# Patient Record
Sex: FEMALE | Race: WHITE | HISPANIC OR LATINO
[De-identification: names, ages, dates, MRNs, and addresses within clinical notes are randomized per-mention and may not be internally consistent; named-entity substitution may affect disease eponyms.]

---

## 2017-01-18 ENCOUNTER — APPOINTMENT (OUTPATIENT)
Dept: BREAST CENTER | Facility: CLINIC | Age: 62
End: 2017-01-18

## 2017-01-18 VITALS
TEMPERATURE: 97.9 F | HEIGHT: 65 IN | DIASTOLIC BLOOD PRESSURE: 89 MMHG | HEART RATE: 88 BPM | BODY MASS INDEX: 26.66 KG/M2 | SYSTOLIC BLOOD PRESSURE: 151 MMHG | WEIGHT: 160 LBS

## 2017-01-18 DIAGNOSIS — Z12.39 ENCOUNTER FOR OTHER SCREENING FOR MALIGNANT NEOPLASM OF BREAST: ICD-10-CM

## 2017-02-15 ENCOUNTER — APPOINTMENT (OUTPATIENT)
Dept: RADIATION ONCOLOGY | Facility: CLINIC | Age: 62
End: 2017-02-15

## 2017-04-05 ENCOUNTER — OUTPATIENT (OUTPATIENT)
Dept: OUTPATIENT SERVICES | Facility: HOSPITAL | Age: 62
LOS: 1 days | End: 2017-04-05
Payer: COMMERCIAL

## 2017-04-05 PROCEDURE — A9560: CPT

## 2017-04-05 PROCEDURE — 78472 GATED HEART PLANAR SINGLE: CPT | Mod: 26

## 2017-04-05 PROCEDURE — 78472 GATED HEART PLANAR SINGLE: CPT

## 2017-07-05 ENCOUNTER — APPOINTMENT (OUTPATIENT)
Dept: RADIATION ONCOLOGY | Facility: CLINIC | Age: 62
End: 2017-07-05

## 2017-07-05 VITALS
DIASTOLIC BLOOD PRESSURE: 79 MMHG | WEIGHT: 164.38 LBS | OXYGEN SATURATION: 100 % | SYSTOLIC BLOOD PRESSURE: 147 MMHG | HEART RATE: 86 BPM | BODY MASS INDEX: 27.35 KG/M2

## 2017-07-05 DIAGNOSIS — C50.919 MALIGNANT NEOPLASM OF UNSPECIFIED SITE OF UNSPECIFIED FEMALE BREAST: ICD-10-CM

## 2017-10-18 ENCOUNTER — OUTPATIENT (OUTPATIENT)
Dept: OUTPATIENT SERVICES | Facility: HOSPITAL | Age: 62
LOS: 1 days | End: 2017-10-18
Payer: COMMERCIAL

## 2017-10-18 PROCEDURE — A9560: CPT

## 2017-10-18 PROCEDURE — 78472 GATED HEART PLANAR SINGLE: CPT

## 2017-10-18 PROCEDURE — 78472 GATED HEART PLANAR SINGLE: CPT | Mod: 26

## 2018-01-10 ENCOUNTER — APPOINTMENT (OUTPATIENT)
Dept: RADIATION ONCOLOGY | Facility: CLINIC | Age: 63
End: 2018-01-10

## 2018-04-23 ENCOUNTER — OTHER (OUTPATIENT)
Age: 63
End: 2018-04-23

## 2018-09-04 ENCOUNTER — OTHER (OUTPATIENT)
Age: 63
End: 2018-09-04

## 2018-09-06 ENCOUNTER — OTHER (OUTPATIENT)
Age: 63
End: 2018-09-06

## 2018-10-05 ENCOUNTER — APPOINTMENT (OUTPATIENT)
Dept: BREAST CENTER | Facility: CLINIC | Age: 63
End: 2018-10-05
Payer: COMMERCIAL

## 2018-10-05 VITALS
HEART RATE: 83 BPM | HEIGHT: 65 IN | BODY MASS INDEX: 27.32 KG/M2 | SYSTOLIC BLOOD PRESSURE: 151 MMHG | DIASTOLIC BLOOD PRESSURE: 83 MMHG | WEIGHT: 164 LBS | TEMPERATURE: 98.3 F

## 2018-10-05 DIAGNOSIS — Z85.3 PERSONAL HISTORY OF MALIGNANT NEOPLASM OF BREAST: ICD-10-CM

## 2018-10-05 PROCEDURE — 99214 OFFICE O/P EST MOD 30 MIN: CPT

## 2018-11-04 ENCOUNTER — FORM ENCOUNTER (OUTPATIENT)
Age: 63
End: 2018-11-04

## 2018-11-05 ENCOUNTER — APPOINTMENT (OUTPATIENT)
Dept: ULTRASOUND IMAGING | Facility: HOSPITAL | Age: 63
End: 2018-11-05

## 2018-11-05 ENCOUNTER — RESULT REVIEW (OUTPATIENT)
Age: 63
End: 2018-11-05

## 2018-11-05 ENCOUNTER — APPOINTMENT (OUTPATIENT)
Dept: BREAST CENTER | Facility: HOSPITAL | Age: 63
End: 2018-11-05

## 2018-11-05 ENCOUNTER — OUTPATIENT (OUTPATIENT)
Dept: OUTPATIENT SERVICES | Facility: HOSPITAL | Age: 63
LOS: 1 days | Discharge: HOME | End: 2018-11-05
Payer: COMMERCIAL

## 2018-11-05 VITALS
HEART RATE: 90 BPM | RESPIRATION RATE: 16 BRPM | SYSTOLIC BLOOD PRESSURE: 147 MMHG | WEIGHT: 161.16 LBS | TEMPERATURE: 98 F | OXYGEN SATURATION: 97 % | HEIGHT: 64 IN | DIASTOLIC BLOOD PRESSURE: 79 MMHG

## 2018-11-05 VITALS
DIASTOLIC BLOOD PRESSURE: 70 MMHG | OXYGEN SATURATION: 99 % | RESPIRATION RATE: 16 BRPM | SYSTOLIC BLOOD PRESSURE: 118 MMHG | HEART RATE: 86 BPM

## 2018-11-05 DIAGNOSIS — Z98.890 OTHER SPECIFIED POSTPROCEDURAL STATES: Chronic | ICD-10-CM

## 2018-11-05 DIAGNOSIS — Z90.89 ACQUIRED ABSENCE OF OTHER ORGANS: Chronic | ICD-10-CM

## 2018-11-05 PROCEDURE — 88305 TISSUE EXAM BY PATHOLOGIST: CPT

## 2018-11-05 PROCEDURE — 19281 PERQ DEVICE BREAST 1ST IMAG: CPT

## 2018-11-05 PROCEDURE — 19285 PERQ DEV BREAST 1ST US IMAG: CPT

## 2018-11-05 PROCEDURE — 71045 X-RAY EXAM CHEST 1 VIEW: CPT

## 2018-11-05 PROCEDURE — 88307 TISSUE EXAM BY PATHOLOGIST: CPT

## 2018-11-05 PROCEDURE — 76098 X-RAY EXAM SURGICAL SPECIMEN: CPT | Mod: 26

## 2018-11-05 PROCEDURE — 19285 PERQ DEV BREAST 1ST US IMAG: CPT | Mod: RT

## 2018-11-05 PROCEDURE — 19281 PERQ DEVICE BREAST 1ST IMAG: CPT | Mod: RT,59

## 2018-11-05 PROCEDURE — 76098 X-RAY EXAM SURGICAL SPECIMEN: CPT

## 2018-11-05 PROCEDURE — C1819: CPT

## 2018-11-05 PROCEDURE — 88341 IMHCHEM/IMCYTCHM EA ADD ANTB: CPT

## 2018-11-05 PROCEDURE — 71045 X-RAY EXAM CHEST 1 VIEW: CPT | Mod: 26

## 2018-11-05 PROCEDURE — 19125 EXCISION BREAST LESION: CPT | Mod: GC

## 2018-11-05 PROCEDURE — 19125 EXCISION BREAST LESION: CPT | Mod: RT

## 2018-11-05 RX ORDER — OXYCODONE AND ACETAMINOPHEN 5; 325 MG/1; MG/1
1 TABLET ORAL
Qty: 5 | Refills: 0
Start: 2018-11-05

## 2018-11-05 RX ORDER — ONDANSETRON 8 MG/1
4 TABLET, FILM COATED ORAL
Qty: 0 | Refills: 0 | Status: DISCONTINUED | OUTPATIENT
Start: 2018-11-05 | End: 2018-11-05

## 2018-11-05 RX ORDER — OXYCODONE AND ACETAMINOPHEN 5; 325 MG/1; MG/1
1 TABLET ORAL
Qty: 0 | Refills: 0 | Status: DISCONTINUED | OUTPATIENT
Start: 2018-11-05 | End: 2018-11-05

## 2018-11-05 RX ORDER — HYDROMORPHONE HYDROCHLORIDE 2 MG/ML
1 INJECTION INTRAMUSCULAR; INTRAVENOUS; SUBCUTANEOUS
Qty: 0 | Refills: 0 | Status: DISCONTINUED | OUTPATIENT
Start: 2018-11-05 | End: 2018-11-05

## 2018-11-05 RX ADMIN — OXYCODONE AND ACETAMINOPHEN 1 TABLET(S): 5; 325 TABLET ORAL at 14:00

## 2018-11-05 RX ADMIN — OXYCODONE AND ACETAMINOPHEN 1 TABLET(S): 5; 325 TABLET ORAL at 14:20

## 2018-11-05 NOTE — BRIEF OPERATIVE NOTE - PROCEDURE
<<-----Click on this checkbox to enter Procedure Needle localization of breast  11/05/2018    Active  ALEAH  Breast mass excision  11/05/2018    Active  DEBBIEE

## 2018-11-05 NOTE — ASU PATIENT PROFILE, ADULT - PSH
History of lumpectomy  BILAT History of lumpectomy  BILAT  History of tonsillectomy  and adenoidectomy

## 2018-11-05 NOTE — PACU DISCHARGE NOTE - COMMENTS
pt verbalized undertsanding all discharged instructions  iv discontinuo  pt left accompanied by family keturah

## 2018-11-05 NOTE — BRIEF OPERATIVE NOTE - OPERATION/FINDINGS
Skin was incised along areola and bovie cautery was used to cut down to distal end of needle.  Massiel clamp was used to isolate area of interest and bovie cautery was used to excise mass.  Intraoperative xray was used to ensure that prior surgical clip was excised.  Breast tissue was reapproximated with suture and skin incision was closed with suture and dermabond.

## 2018-11-07 ENCOUNTER — CHART COPY (OUTPATIENT)
Age: 63
End: 2018-11-07

## 2018-11-07 LAB — SURGICAL PATHOLOGY STUDY: SIGNIFICANT CHANGE UP

## 2018-11-14 ENCOUNTER — APPOINTMENT (OUTPATIENT)
Dept: BREAST CENTER | Facility: CLINIC | Age: 63
End: 2018-11-14

## 2018-11-16 ENCOUNTER — APPOINTMENT (OUTPATIENT)
Dept: BREAST CENTER | Facility: CLINIC | Age: 63
End: 2018-11-16
Payer: COMMERCIAL

## 2018-11-16 VITALS
HEART RATE: 93 BPM | HEIGHT: 65 IN | WEIGHT: 164 LBS | SYSTOLIC BLOOD PRESSURE: 146 MMHG | DIASTOLIC BLOOD PRESSURE: 75 MMHG | BODY MASS INDEX: 27.32 KG/M2 | TEMPERATURE: 99.2 F

## 2018-11-16 DIAGNOSIS — R92.8 OTHER ABNORMAL AND INCONCLUSIVE FINDINGS ON DIAGNOSTIC IMAGING OF BREAST: ICD-10-CM

## 2018-11-16 PROCEDURE — 99024 POSTOP FOLLOW-UP VISIT: CPT

## 2019-02-01 ENCOUNTER — RESULT REVIEW (OUTPATIENT)
Age: 64
End: 2019-02-01

## 2019-02-26 ENCOUNTER — RESULT REVIEW (OUTPATIENT)
Age: 64
End: 2019-02-26

## 2019-05-02 NOTE — PACU DISCHARGE NOTE - AIRWAY PATENCY:
Medical Necessity Clause: This procedure was medically necessary because the lesions that were treated were: Total Volume Injected (Ccs- Only Use Numbers And Decimals): 1 Satisfactory X Size Of Lesion In Cm (Optional): 0 Consent: The risks of atrophy were reviewed with the patient. Detail Level: Detailed Kenalog Preparation: Kenalog Include Z78.9 (Other Specified Conditions Influencing Health Status) As An Associated Diagnosis?: No Concentration Of Solution Injected (Mg/Ml): 5.0

## 2020-07-29 ENCOUNTER — RESULT REVIEW (OUTPATIENT)
Age: 65
End: 2020-07-29

## 2020-09-02 ENCOUNTER — RESULT REVIEW (OUTPATIENT)
Age: 65
End: 2020-09-02

## 2022-04-07 PROBLEM — C50.919 MALIGNANT NEOPLASM OF UNSPECIFIED SITE OF UNSPECIFIED FEMALE BREAST: Chronic | Status: ACTIVE | Noted: 2018-11-05

## 2022-04-17 ENCOUNTER — RESULT REVIEW (OUTPATIENT)
Age: 67
End: 2022-04-17

## 2022-04-18 ENCOUNTER — APPOINTMENT (OUTPATIENT)
Dept: GASTROENTEROLOGY | Facility: AMBULATORY SURGERY CENTER | Age: 67
End: 2022-04-18
Payer: COMMERCIAL

## 2022-04-18 PROCEDURE — 43239 EGD BIOPSY SINGLE/MULTIPLE: CPT

## 2022-04-19 RX ORDER — PANTOPRAZOLE 40 MG/1
40 TABLET, DELAYED RELEASE ORAL
Qty: 30 | Refills: 3 | Status: ACTIVE | COMMUNITY
Start: 2022-04-19 | End: 1900-01-01

## 2022-05-11 ENCOUNTER — APPOINTMENT (OUTPATIENT)
Dept: GASTROENTEROLOGY | Facility: CLINIC | Age: 67
End: 2022-05-11
Payer: COMMERCIAL

## 2022-05-11 VITALS
BODY MASS INDEX: 26.82 KG/M2 | HEIGHT: 65 IN | DIASTOLIC BLOOD PRESSURE: 80 MMHG | WEIGHT: 161 LBS | SYSTOLIC BLOOD PRESSURE: 140 MMHG

## 2022-05-11 DIAGNOSIS — Z85.3 PERSONAL HISTORY OF MALIGNANT NEOPLASM OF BREAST: ICD-10-CM

## 2022-05-11 DIAGNOSIS — K21.9 GASTRO-ESOPHAGEAL REFLUX DISEASE W/OUT ESOPHAGITIS: ICD-10-CM

## 2022-05-11 PROCEDURE — 99213 OFFICE O/P EST LOW 20 MIN: CPT

## 2022-05-11 NOTE — HISTORY OF PRESENT ILLNESS
[FreeTextEntry1] : Endoscopy revealed normal mucosa and  a small hiatal hernia. Biopsies were negative for H. pylori. She was placed on pantoprazole 40 mg daily and  is doing well. She has no complaints.

## 2022-05-11 NOTE — PHYSICAL EXAM
[Sclera] : the sclera and conjunctiva were normal [PERRL With Normal Accommodation] : pupils were equal in size, round, and reactive to light [Extraocular Movements] : extraocular movements were intact [Outer Ear] : the ears and nose were normal in appearance [Oropharynx] : the oropharynx was normal [Neck Appearance] : the appearance of the neck was normal [Neck Cervical Mass (___cm)] : no neck mass was observed [Jugular Venous Distention Increased] : there was no jugular-venous distention [Thyroid Diffuse Enlargement] : the thyroid was not enlarged [Thyroid Nodule] : there were no palpable thyroid nodules [] : no respiratory distress [Auscultation Breath Sounds / Voice Sounds] : lungs were clear to auscultation bilaterally

## 2022-05-11 NOTE — CONSULT LETTER
[Dear  ___] : Dear  [unfilled], [Consult Letter:] : I had the pleasure of evaluating your patient, [unfilled]. [( Thank you for referring [unfilled] for consultation for _____ )] : Thank you for referring [unfilled] for consultation for [unfilled] [Please see my note below.] : Please see my note below. [Consult Closing:] : Thank you very much for allowing me to participate in the care of this patient.  If you have any questions, please do not hesitate to contact me. [Sincerely,] : Sincerely, [FreeTextEntry3] : Kleber Jean MD\par \par Gastroenterology\par [facility]\par [facility]\par \par

## 2023-03-08 ENCOUNTER — EMERGENCY (EMERGENCY)
Facility: HOSPITAL | Age: 68
LOS: 1 days | Discharge: HOME | End: 2023-03-08
Attending: STUDENT IN AN ORGANIZED HEALTH CARE EDUCATION/TRAINING PROGRAM | Admitting: STUDENT IN AN ORGANIZED HEALTH CARE EDUCATION/TRAINING PROGRAM
Payer: COMMERCIAL

## 2023-03-08 VITALS
HEART RATE: 84 BPM | WEIGHT: 149.91 LBS | TEMPERATURE: 99 F | DIASTOLIC BLOOD PRESSURE: 86 MMHG | SYSTOLIC BLOOD PRESSURE: 172 MMHG | OXYGEN SATURATION: 95 % | HEIGHT: 65 IN | RESPIRATION RATE: 18 BRPM

## 2023-03-08 DIAGNOSIS — Z90.89 ACQUIRED ABSENCE OF OTHER ORGANS: Chronic | ICD-10-CM

## 2023-03-08 DIAGNOSIS — Z98.890 OTHER SPECIFIED POSTPROCEDURAL STATES: Chronic | ICD-10-CM

## 2023-03-08 PROCEDURE — 96372 THER/PROPH/DIAG INJ SC/IM: CPT

## 2023-03-08 PROCEDURE — 99283 EMERGENCY DEPT VISIT LOW MDM: CPT

## 2023-03-08 PROCEDURE — 99284 EMERGENCY DEPT VISIT MOD MDM: CPT

## 2023-03-08 RX ORDER — METHOCARBAMOL 500 MG/1
1000 TABLET, FILM COATED ORAL ONCE
Refills: 0 | Status: COMPLETED | OUTPATIENT
Start: 2023-03-08 | End: 2023-03-08

## 2023-03-08 RX ORDER — KETOROLAC TROMETHAMINE 30 MG/ML
15 SYRINGE (ML) INJECTION ONCE
Refills: 0 | Status: DISCONTINUED | OUTPATIENT
Start: 2023-03-08 | End: 2023-03-08

## 2023-03-08 RX ORDER — LIDOCAINE 4 G/100G
1 CREAM TOPICAL
Qty: 7 | Refills: 0
Start: 2023-03-08 | End: 2023-03-14

## 2023-03-08 RX ORDER — IBUPROFEN 200 MG
1 TABLET ORAL
Qty: 50 | Refills: 0
Start: 2023-03-08

## 2023-03-08 RX ORDER — LIDOCAINE 4 G/100G
1 CREAM TOPICAL ONCE
Refills: 0 | Status: COMPLETED | OUTPATIENT
Start: 2023-03-08 | End: 2023-03-08

## 2023-03-08 RX ORDER — METHOCARBAMOL 500 MG/1
2 TABLET, FILM COATED ORAL
Qty: 30 | Refills: 0
Start: 2023-03-08 | End: 2023-03-12

## 2023-03-08 RX ADMIN — METHOCARBAMOL 1000 MILLIGRAM(S): 500 TABLET, FILM COATED ORAL at 09:26

## 2023-03-08 RX ADMIN — Medication 15 MILLIGRAM(S): at 09:27

## 2023-03-08 RX ADMIN — Medication 15 MILLIGRAM(S): at 09:42

## 2023-03-08 RX ADMIN — LIDOCAINE 1 PATCH: 4 CREAM TOPICAL at 09:27

## 2023-03-08 NOTE — ED ADULT NURSE NOTE - NSICDXPASTSURGICALHX_GEN_ALL_CORE_FT
PAST SURGICAL HISTORY:  History of lumpectomy BILAT    History of tonsillectomy and adenoidectomy

## 2023-03-08 NOTE — ED ADULT NURSE NOTE - OBJECTIVE STATEMENT
68 y/o female c/o lower back pain x2 weeks, as per pt, "I had a XR yesterday and my doctor said I need an MRI." pt currently on Cyclobenzaprine 10mg and Ibuprofen 400mg. pt denies any injury ot trauma to back. pt left arm precaution due to mastectomy.

## 2023-03-08 NOTE — ED ADULT TRIAGE NOTE - CHIEF COMPLAINT QUOTE
Pt co lower back pain x2 wks, denies injury or trauma. Had XR yesterday without findings. Ambulatory w steady gait into triage area.

## 2023-03-08 NOTE — ED PROVIDER NOTE - PHYSICAL EXAMINATION
CONST: nontoxic NAD speaking in full sentences  HEAD: atraumatic  EYES: conjunctivae clear, PERRL, EOMI  ENT: mmm  NECK: supple/FROM, nttp, no jvd  BACK: l-spine with no discrete midline tenderness but pt endorses pain along entire l-spine area, 5/5 strength to b/l LEs, sensation intact  CARD: rrr no murmurs  CHEST: ctab no r/r/w, no stridor/retractions/tripoding  ABD: soft, nd, nttp, no rebound/guarding  EXT: FROM, symmetric distal pulses intact  SKIN: warm, dry, no rash, no pedal edema/ttp/rash, cap refill <2sec  NEURO: a+ox3, 5/5 strength x4, gross sensation intact x4, baseline gait CONST: nontoxic NAD speaking in full sentences  HEAD: atraumatic  EYES: conjunctivae clear, PERRL, EOMI  ENT: mmm  NECK: supple/FROM  BACK: L-spine with no discrete midline tenderness but pt endorses pain along entire lumbar paraspinal area, 5/5 strength to b/l LEs, sensation intact  CARD: rrr  CHEST: no stridor/retractions/tripoding  EXT: FROM, symmetric distal pulses intact  SKIN: warm, dry, no rash  NEURO: a+ox3, 5/5 strength x4, gross sensation intact x4

## 2023-03-08 NOTE — ED PROVIDER NOTE - IV ALTEPLASE DOOR HIDDEN
RT assisted with arterial line insertion. Shy kit and pressure bag were provided.     Óscar Muniz,RRT.   show

## 2023-03-08 NOTE — ED PROVIDER NOTE - TEMPLATE, MLM
Rest.  Apply warm moist heat to the affected area.  Return to the emergency room immediately for any worsening pain, shortness of breath, nausea vomiting  
Back Pain

## 2023-03-08 NOTE — ED PROVIDER NOTE - CLINICAL SUMMARY MEDICAL DECISION MAKING FREE TEXT BOX
Suspect likely msk back pain. No indication for ED imaging. No concern for spinal cord pathology given lack of neurological symptoms and findings on neuro exam. Plan for pain control. Will educate/ regarding PT and possible outpatient MRI as per outpatient doctors. Suspect likely msk back pain. No indication for ED imaging. No concern for spinal cord pathology given lack of neurological symptoms and normal findings on neuro exam. Plan for pain control, Will educate/ regarding outpt PT and possible outpatient MRI as per her outpatient doctors.

## 2023-03-08 NOTE — ED PROVIDER NOTE - PATIENT PORTAL LINK FT
You can access the FollowMyHealth Patient Portal offered by [facility] by registering at the following website: [email or web address]. By joining [facility]’s FollowMyHealth portal, you will also be able to view your health information using other applications (apps) compatible with our system.

## 2023-03-08 NOTE — ED PROVIDER NOTE - OBJECTIVE STATEMENT
68 y/o F with PMHx remote breast cancer presents to ED with 2 weeks of low back pain to lumbar region. Pain is b/l and across entire lumbar back. Pt states pain began 2 weeks ago, felt like a pulled muscle, and has been persistent since. She has been taking motrin and flexeril as prescribed by her PCP. Pt went to urgent care yesterday, had an l-spine XR that showed an age indeterminate L1 fracture, and was recommended correlation with MRI. Denies any radiation of pain to legs, numbness/tingling/weakness of extremities, difficulty ambulating, bladder/bowel incontinence, or saddle anesthesia. 68 y/o F with PMHx remote breast cancer presents to ED with 2 weeks of low back pain. Pain is b/l and across entire lumbar back. Pt states pain began 2 weeks ago, felt like a pulled muscle, and has been persistent since. She has been taking motrin and flexeril as prescribed by her PCP. Pt went to urgent care yesterday, had an XR L-spine that showed an age indeterminate L1 fracture, and was recommended correlation with MRI. Denies any radiation of pain to legs, numbness/tingling/weakness of extremities, difficulty ambulating, bladder/bowel incontinence, or saddle anesthesia.

## 2023-03-09 DIAGNOSIS — Z85.3 PERSONAL HISTORY OF MALIGNANT NEOPLASM OF BREAST: ICD-10-CM

## 2023-03-09 DIAGNOSIS — Z88.2 ALLERGY STATUS TO SULFONAMIDES: ICD-10-CM

## 2023-03-09 DIAGNOSIS — M54.50 LOW BACK PAIN, UNSPECIFIED: ICD-10-CM

## 2023-03-09 DIAGNOSIS — Z88.0 ALLERGY STATUS TO PENICILLIN: ICD-10-CM

## 2023-03-09 DIAGNOSIS — Z90.09 ACQUIRED ABSENCE OF OTHER PART OF HEAD AND NECK: ICD-10-CM

## 2023-03-09 DIAGNOSIS — Z88.1 ALLERGY STATUS TO OTHER ANTIBIOTIC AGENTS STATUS: ICD-10-CM

## 2023-03-10 ENCOUNTER — APPOINTMENT (OUTPATIENT)
Dept: ORTHOPEDIC SURGERY | Facility: CLINIC | Age: 68
End: 2023-03-10
Payer: COMMERCIAL

## 2023-03-10 VITALS
WEIGHT: 161 LBS | DIASTOLIC BLOOD PRESSURE: 77 MMHG | HEART RATE: 82 BPM | SYSTOLIC BLOOD PRESSURE: 163 MMHG | OXYGEN SATURATION: 98 % | BODY MASS INDEX: 26.82 KG/M2 | HEIGHT: 65 IN

## 2023-03-10 PROCEDURE — 99205 OFFICE O/P NEW HI 60 MIN: CPT

## 2023-03-10 RX ORDER — METHYLPREDNISOLONE 4 MG/1
4 TABLET ORAL
Qty: 1 | Refills: 0 | Status: ACTIVE | COMMUNITY
Start: 2023-03-10 | End: 1900-01-01

## 2023-03-10 NOTE — HISTORY OF PRESENT ILLNESS
[de-identified] : 77 female presents for evaluation of acute onset of right-sided low back pain which began on 2/18/2023.  She denies any inciting factor or traumatic incident.  She denies any previous episodes of back pain with this level debilitation.  She presented to a primary care physician who prescribed ibuprofen and recently to emergency department where a muscle relaxant was prescribed.  She denies any concerns relief from these medications.  She has not undergone any physical therapy.  Pain describes aching burning stabbing in the right side lumbosacral junction without radiation.  Walking standing or bending forward are aggravating.  She denies any significant pain at night.  Denies any change in bowel or bladder habits

## 2023-03-10 NOTE — PHYSICAL EXAM
[Normal Touch] : sensation intact for touch [UE/LE] : Sensory: Intact in bilateral upper & lower extremities [Normal Proprioception] : sensation intact for proprioception [Normal] : No costovertebral angle tenderness and no spinal tenderness [de-identified] : SLR negative bilaterally\par Bilateral EHL and tib ant 4 out of 5\par Otherwise 5 out of 5 bilateral lower extremities [de-identified] : AP lateral scoliosis x-ray 3/10/2023 Ortho PACS:\par Coronal balance relatively well-maintained.  Sagittal balance is age-appropriate.  L1 compression fracture with minimal focal kyphotic deformity at the thoracolumbar junction which appears old in nature.  The space heights are maintained throughout the lumbar spine without evidence of instability.  Some age-appropriate spondylotic change throughout

## 2023-03-10 NOTE — ASSESSMENT
[FreeTextEntry1] : 67-year-old female presents with 3 weeks of acute onset of right-sided lumbosacral pain without radiculopathy without response to relative rest and oral anti-inflammatories; weakness of tib ant and EHL noted bilaterally

## 2023-03-10 NOTE — DISCUSSION/SUMMARY
[de-identified] : Thorough discussion was held the patient regarding her condition all treatment options risks benefits and alternatives.  I recommended a course of formal physical therapy for symptomatic relief.  Have also prescribed a Medrol Dosepak given the extent of her pain.  Given her weakness bilaterally have also prescribed an MRI of the lumbar spine to rule out any compressive pathology.  She will follow-up with me as soon as images are completed so that we may review them and plan appropriate next steps\par \par I have spent greater than 60 minutes preparing to see the patient, collecting relevant history, performing a thorough history and physical examination, counseling the patient regarding my findings ordering the appropriate therapies and tests, communicating with other relevant healthcare professionals, documenting my encounter and coordinating care.\par

## 2023-03-17 ENCOUNTER — OUTPATIENT (OUTPATIENT)
Dept: OUTPATIENT SERVICES | Facility: HOSPITAL | Age: 68
LOS: 1 days | End: 2023-03-17
Payer: COMMERCIAL

## 2023-03-17 ENCOUNTER — APPOINTMENT (OUTPATIENT)
Dept: MRI IMAGING | Facility: HOSPITAL | Age: 68
End: 2023-03-17
Payer: COMMERCIAL

## 2023-03-17 DIAGNOSIS — Z90.89 ACQUIRED ABSENCE OF OTHER ORGANS: Chronic | ICD-10-CM

## 2023-03-17 DIAGNOSIS — Z98.890 OTHER SPECIFIED POSTPROCEDURAL STATES: Chronic | ICD-10-CM

## 2023-03-17 PROCEDURE — 72148 MRI LUMBAR SPINE W/O DYE: CPT

## 2023-03-17 PROCEDURE — 72148 MRI LUMBAR SPINE W/O DYE: CPT | Mod: 26

## 2023-03-20 RX ORDER — TRAMADOL HYDROCHLORIDE 50 MG/1
50 TABLET, COATED ORAL 3 TIMES DAILY
Qty: 40 | Refills: 0 | Status: ACTIVE | COMMUNITY
Start: 2023-03-20 | End: 1900-01-01

## 2023-03-23 ENCOUNTER — APPOINTMENT (OUTPATIENT)
Dept: ORTHOPEDIC SURGERY | Facility: CLINIC | Age: 68
End: 2023-03-23
Payer: COMMERCIAL

## 2023-03-23 DIAGNOSIS — M51.27 OTHER INTERVERTEBRAL DISC DISPLACEMENT, LUMBOSACRAL REGION: ICD-10-CM

## 2023-03-23 DIAGNOSIS — M54.50 LOW BACK PAIN, UNSPECIFIED: ICD-10-CM

## 2023-03-23 PROCEDURE — 99443: CPT

## 2023-03-23 NOTE — ASSESSMENT
[FreeTextEntry1] : 67-year-old female with several weeks of acute severe back pain in the setting of likely new L5-S1 HNP

## 2023-03-23 NOTE — PHYSICAL EXAM
[de-identified] : MRI lumbar spine 3/17/2023 care stream:\par Chronic appearing compression deformity of L1 with intact PLL some retropulsion nearly abuts the conus without compression. At L5-S1 there is a broad central disc bulge with small high intensity zone that is abutting the thecal sac traversing S1 nerve roots bilaterally but not causing any significant central canal stenosis.
Regular rate & rhythm, normal S1, S2; no murmurs, gallops or rubs; no S3, S4

## 2023-03-23 NOTE — HISTORY OF PRESENT ILLNESS
[de-identified] : Spoke to Andrew about her ongoing lumbosacral pain.  She denies any significant change in symptoms since her in person visit.  She has been completing physical therapy with minimal improvement.  Denies any radicular symptoms.

## 2023-03-23 NOTE — DISCUSSION/SUMMARY
[de-identified] : I again discussed with Andrew the nature of her back pain and the likely contributing factor of what appears to be a new disc herniation L5-S1.  She not having any radicular symptoms or neurologic complaints or findings on my recent exam.  I have recommended a conservative approach with ongoing physical therapy oral medications as needed.  I also discussed with the potential utility of an epidural steroid injection for symptomatic relief.  We will set this referral and keep in touch regarding her results subsequently.\par \par I have also discussed with her given her distant history of breast cancer that it may be prudent to rule out any pathologic cause of the L1 compression fracture.  I have not noticed any other lesions and radiography is most consistent with osteoporotic etiology though I do think the most conservative course would be to rule out any new metastasis.  She has an upcoming appointment with Dr. Irena Dempsey her regular oncologist.  I will ensure that records are available so that any further work-up may be undertaken as needed.\par \par I have spent greater than 30 minutes preparing to see the patient, collecting relevant history, performing a thorough history and physical examination, counseling the patient regarding my findings ordering the appropriate therapies and tests, communicating with other relevant healthcare professionals, documenting my encounter and coordinating care.\par

## 2023-03-24 PROBLEM — M51.27 HERNIATION OF INTERVERTEBRAL DISC BETWEEN L5 AND S1: Status: ACTIVE | Noted: 2023-03-24

## 2025-03-10 ENCOUNTER — APPOINTMENT (OUTPATIENT)
Dept: GASTROENTEROLOGY | Facility: CLINIC | Age: 70
End: 2025-03-10
Payer: MEDICARE

## 2025-03-10 VITALS
TEMPERATURE: 98 F | BODY MASS INDEX: 26.82 KG/M2 | OXYGEN SATURATION: 99 % | DIASTOLIC BLOOD PRESSURE: 80 MMHG | HEART RATE: 80 BPM | RESPIRATION RATE: 14 BRPM | SYSTOLIC BLOOD PRESSURE: 128 MMHG | HEIGHT: 65 IN | WEIGHT: 161 LBS

## 2025-03-10 DIAGNOSIS — Z86.0100 PERSONAL HISTORY OF COLON POLYPS, UNSPECIFIED: ICD-10-CM

## 2025-03-10 DIAGNOSIS — Z85.3 PERSONAL HISTORY OF MALIGNANT NEOPLASM OF BREAST: ICD-10-CM

## 2025-03-10 DIAGNOSIS — K59.00 CONSTIPATION, UNSPECIFIED: ICD-10-CM

## 2025-03-10 DIAGNOSIS — Z12.11 ENCOUNTER FOR SCREENING FOR MALIGNANT NEOPLASM OF COLON: ICD-10-CM

## 2025-03-10 DIAGNOSIS — Z86.79 PERSONAL HISTORY OF OTHER DISEASES OF THE CIRCULATORY SYSTEM: ICD-10-CM

## 2025-03-10 PROCEDURE — 99204 OFFICE O/P NEW MOD 45 MIN: CPT

## 2025-03-10 RX ORDER — METOPROLOL TARTRATE 100 MG/1
100 TABLET ORAL
Refills: 0 | Status: ACTIVE | COMMUNITY

## 2025-03-10 RX ORDER — SODIUM SULFATE, MAGNESIUM SULFATE, AND POTASSIUM CHLORIDE 17.75; 2.7; 2.25 G/1; G/1; G/1
1479-225-188 TABLET ORAL TWICE DAILY
Qty: 24 | Refills: 0 | Status: ACTIVE | COMMUNITY
Start: 2025-03-10 | End: 1900-01-01

## 2025-04-24 ENCOUNTER — APPOINTMENT (OUTPATIENT)
Dept: GASTROENTEROLOGY | Facility: AMBULATORY SURGERY CENTER | Age: 70
End: 2025-04-24
Payer: MEDICARE

## 2025-04-24 ENCOUNTER — LABORATORY RESULT (OUTPATIENT)
Age: 70
End: 2025-04-24

## 2025-04-24 PROCEDURE — 45380 COLONOSCOPY AND BIOPSY: CPT

## 2025-05-14 NOTE — ASU PATIENT PROFILE, ADULT - NS PRO MODE OF ARRIVAL
-tele monitoring   -trend troponin   -repeat EKG   -analgesics p.r.n.   -nitrites p.r.n.   -cardiology consult     ambulatory

## 2025-08-13 ENCOUNTER — APPOINTMENT (OUTPATIENT)
Age: 70
End: 2025-08-13
Payer: MEDICARE

## 2025-08-13 ENCOUNTER — RX RENEWAL (OUTPATIENT)
Age: 70
End: 2025-08-13

## 2025-08-13 ENCOUNTER — LABORATORY RESULT (OUTPATIENT)
Age: 70
End: 2025-08-13

## 2025-08-13 VITALS
WEIGHT: 171 LBS | OXYGEN SATURATION: 94 % | HEART RATE: 81 BPM | RESPIRATION RATE: 14 BRPM | DIASTOLIC BLOOD PRESSURE: 79 MMHG | HEIGHT: 65 IN | TEMPERATURE: 97.8 F | SYSTOLIC BLOOD PRESSURE: 144 MMHG | BODY MASS INDEX: 28.49 KG/M2

## 2025-08-13 VITALS — DIASTOLIC BLOOD PRESSURE: 70 MMHG | SYSTOLIC BLOOD PRESSURE: 138 MMHG

## 2025-08-13 DIAGNOSIS — K57.30 DIVERTICULOSIS OF LARGE INTESTINE W/OUT PERFORATION OR ABSCESS W/OUT BLEEDING: ICD-10-CM

## 2025-08-13 DIAGNOSIS — R09.81 NASAL CONGESTION: ICD-10-CM

## 2025-08-13 DIAGNOSIS — H81.10 BENIGN PAROXYSMAL VERTIGO, UNSPECIFIED EAR: ICD-10-CM

## 2025-08-13 DIAGNOSIS — Z00.00 ENCOUNTER FOR GENERAL ADULT MEDICAL EXAMINATION W/OUT ABNORMAL FINDINGS: ICD-10-CM

## 2025-08-13 DIAGNOSIS — Z86.2 PERSONAL HISTORY OF DISEASES OF THE BLOOD AND BLOOD-FORMING ORGANS AND CERTAIN DISORDERS INVOLVING THE IMMUNE MECHANISM: ICD-10-CM

## 2025-08-13 DIAGNOSIS — Z86.79 PERSONAL HISTORY OF OTHER DISEASES OF THE CIRCULATORY SYSTEM: ICD-10-CM

## 2025-08-13 DIAGNOSIS — H61.20 IMPACTED CERUMEN, UNSPECIFIED EAR: ICD-10-CM

## 2025-08-13 PROCEDURE — 99205 OFFICE O/P NEW HI 60 MIN: CPT

## 2025-08-13 PROCEDURE — 81003 URINALYSIS AUTO W/O SCOPE: CPT | Mod: QW

## 2025-08-13 PROCEDURE — 36415 COLL VENOUS BLD VENIPUNCTURE: CPT

## 2025-08-13 PROCEDURE — G2211 COMPLEX E/M VISIT ADD ON: CPT

## 2025-08-13 PROCEDURE — 93000 ELECTROCARDIOGRAM COMPLETE: CPT

## 2025-08-13 RX ORDER — FLUTICASONE PROPIONATE 50 UG/1
50 SPRAY NASAL DAILY
Qty: 48 | Refills: 0 | Status: ACTIVE | COMMUNITY
Start: 2025-08-13 | End: 1900-01-01

## 2025-08-13 RX ORDER — MECLIZINE HYDROCHLORIDE 12.5 MG/1
12.5 TABLET ORAL DAILY
Qty: 14 | Refills: 0 | Status: ACTIVE | COMMUNITY
Start: 2025-08-13 | End: 1900-01-01

## 2025-08-13 RX ORDER — CAMPHOR 0.45 %
25 GEL (GRAM) TOPICAL
Qty: 15 | Refills: 0 | Status: ACTIVE | COMMUNITY
Start: 2025-08-13 | End: 1900-01-01

## 2025-08-18 LAB
ALBUMIN SERPL ELPH-MCNC: 4.5 G/DL
ALP BLD-CCNC: 105 U/L
ALT SERPL-CCNC: 23 U/L
ANION GAP SERPL CALC-SCNC: 14 MMOL/L
APPEARANCE: CLEAR
AST SERPL-CCNC: 19 U/L
BACTERIA: NEGATIVE /HPF
BASOPHILS # BLD AUTO: 0.09 K/UL
BASOPHILS NFR BLD AUTO: 0.8 %
BILIRUB SERPL-MCNC: 0.2 MG/DL
BILIRUBIN URINE: NEGATIVE
BLOOD URINE: NEGATIVE
BUN SERPL-MCNC: 16 MG/DL
CALCIUM SERPL-MCNC: 11.1 MG/DL
CAST: 0 /LPF
CHLORIDE SERPL-SCNC: 99 MMOL/L
CHOLEST SERPL-MCNC: 199 MG/DL
CO2 SERPL-SCNC: 27 MMOL/L
COLOR: YELLOW
CREAT SERPL-MCNC: 0.65 MG/DL
EGFRCR SERPLBLD CKD-EPI 2021: 95 ML/MIN/1.73M2
EOSINOPHIL # BLD AUTO: 0.21 K/UL
EOSINOPHIL NFR BLD AUTO: 1.9 %
EPITHELIAL CELLS: 8 /HPF
ESTIMATED AVERAGE GLUCOSE: 137 MG/DL
FERRITIN SERPL-MCNC: 77 NG/ML
FOLATE SERPL-MCNC: 11.1 NG/ML
GLUCOSE QUALITATIVE U: NEGATIVE MG/DL
GLUCOSE SERPL-MCNC: 92 MG/DL
HBA1C MFR BLD HPLC: 6.4 %
HCT VFR BLD CALC: 38.9 %
HDLC SERPL-MCNC: 58 MG/DL
HGB BLD-MCNC: 11.7 G/DL
IF BLOCK AB SER QL: 1 AU/ML
IMM GRANULOCYTES NFR BLD AUTO: 0.5 %
IRON SATN MFR SERPL: 11 %
IRON SERPL-MCNC: 40 UG/DL
KETONES URINE: NEGATIVE MG/DL
LDLC SERPL-MCNC: 120 MG/DL
LEUKOCYTE ESTERASE URINE: NEGATIVE
LYMPHOCYTES # BLD AUTO: 4.07 K/UL
LYMPHOCYTES NFR BLD AUTO: 36 %
MAN DIFF?: NORMAL
MCHC RBC-ENTMCNC: 17.5 PG
MCHC RBC-ENTMCNC: 30.1 G/DL
MCV RBC AUTO: 58.3 FL
MICROSCOPIC-UA: NORMAL
MONOCYTES # BLD AUTO: 0.73 K/UL
MONOCYTES NFR BLD AUTO: 6.5 %
NEUTROPHILS # BLD AUTO: 6.15 K/UL
NEUTROPHILS NFR BLD AUTO: 54.3 %
NITRITE URINE: NEGATIVE
NONHDLC SERPL-MCNC: 141 MG/DL
PH URINE: 6
PLATELET # BLD AUTO: 291 K/UL
POTASSIUM SERPL-SCNC: 4.8 MMOL/L
PROT SERPL-MCNC: 7.4 G/DL
PROTEIN URINE: NEGATIVE MG/DL
RBC # BLD: 6.67 M/UL
RBC # FLD: 19.5 %
RED BLOOD CELLS URINE: 0 /HPF
SODIUM SERPL-SCNC: 140 MMOL/L
SPECIFIC GRAVITY URINE: 1.02
TIBC SERPL-MCNC: 364 UG/DL
TRIGL SERPL-MCNC: 120 MG/DL
TSH SERPL-ACNC: 1.63 UIU/ML
UIBC SERPL-MCNC: 324 UG/DL
UROBILINOGEN URINE: 0.2 MG/DL
VIT B12 SERPL-MCNC: 357 PG/ML
WBC # FLD AUTO: 11.31 K/UL
WHITE BLOOD CELLS URINE: 1 /HPF

## 2025-08-19 LAB
HGB A MFR BLD: 94.2 %
HGB A2 MFR BLD: 5.8 %
HGB FRACT BLD-IMP: NORMAL

## 2025-08-21 LAB — METHYLMALONATE SERPL-SCNC: 389 NMOL/L

## 2025-08-27 ENCOUNTER — APPOINTMENT (OUTPATIENT)
Age: 70
End: 2025-08-27
Payer: MEDICARE

## 2025-08-27 ENCOUNTER — LABORATORY RESULT (OUTPATIENT)
Age: 70
End: 2025-08-27

## 2025-08-27 VITALS
HEIGHT: 64 IN | OXYGEN SATURATION: 97 % | DIASTOLIC BLOOD PRESSURE: 91 MMHG | SYSTOLIC BLOOD PRESSURE: 166 MMHG | RESPIRATION RATE: 16 BRPM | WEIGHT: 161 LBS | BODY MASS INDEX: 27.49 KG/M2 | HEART RATE: 72 BPM | TEMPERATURE: 97.1 F

## 2025-08-27 VITALS — DIASTOLIC BLOOD PRESSURE: 70 MMHG | SYSTOLIC BLOOD PRESSURE: 150 MMHG

## 2025-08-27 DIAGNOSIS — D72.829 ELEVATED WHITE BLOOD CELL COUNT, UNSPECIFIED: ICD-10-CM

## 2025-08-27 DIAGNOSIS — E83.52 HYPERCALCEMIA: ICD-10-CM

## 2025-08-27 DIAGNOSIS — R73.03 PREDIABETES.: ICD-10-CM

## 2025-08-27 DIAGNOSIS — Z86.79 PERSONAL HISTORY OF OTHER DISEASES OF THE CIRCULATORY SYSTEM: ICD-10-CM

## 2025-08-27 DIAGNOSIS — E78.5 HYPERLIPIDEMIA, UNSPECIFIED: ICD-10-CM

## 2025-08-27 PROCEDURE — 99213 OFFICE O/P EST LOW 20 MIN: CPT

## 2025-08-27 PROCEDURE — G2211 COMPLEX E/M VISIT ADD ON: CPT

## 2025-08-27 PROCEDURE — 36415 COLL VENOUS BLD VENIPUNCTURE: CPT

## 2025-08-27 RX ORDER — METOPROLOL SUCCINATE 100 MG/1
100 TABLET, EXTENDED RELEASE ORAL DAILY
Qty: 90 | Refills: 1 | Status: ACTIVE | COMMUNITY
Start: 2025-08-27 | End: 1900-01-01

## 2025-08-27 RX ORDER — AMLODIPINE BESYLATE 5 MG/1
5 TABLET ORAL
Qty: 90 | Refills: 0 | Status: ACTIVE | COMMUNITY
Start: 2025-08-27 | End: 1900-01-01

## 2025-09-03 LAB
25(OH)D3 SERPL-MCNC: 43.9 NG/ML
BASOPHILS # BLD AUTO: 0.2 K/UL
BASOPHILS NFR BLD AUTO: 1.8 %
CA-I SERPL-SCNC: 5.3 MG/DL
EOSINOPHIL # BLD AUTO: 0.18 K/UL
EOSINOPHIL NFR BLD AUTO: 1.7 %
HCT VFR BLD CALC: 39.4 %
HGB BLD-MCNC: 11.7 G/DL
LYMPHOCYTES # BLD AUTO: 3.23 K/UL
LYMPHOCYTES NFR BLD AUTO: 29.8 %
MAGNESIUM SERPL-MCNC: 2.2 MG/DL
MAN DIFF?: NORMAL
MCHC RBC-ENTMCNC: 17.4 PG
MCHC RBC-ENTMCNC: 29.7 G/DL
MCV RBC AUTO: 58.6 FL
MONOCYTES # BLD AUTO: 0.57 K/UL
MONOCYTES NFR BLD AUTO: 5.3 %
NEUTROPHILS # BLD AUTO: 6.46 K/UL
NEUTROPHILS NFR BLD AUTO: 59.6 %
PHOSPHATE SERPL-MCNC: 3.3 MG/DL
PLATELET # BLD AUTO: 261 K/UL
RBC # BLD: 6.72 M/UL
RBC # FLD: 20.2 %
WBC # FLD AUTO: 10.84 K/UL